# Patient Record
Sex: MALE | Race: WHITE | NOT HISPANIC OR LATINO | Employment: UNEMPLOYED | URBAN - METROPOLITAN AREA
[De-identification: names, ages, dates, MRNs, and addresses within clinical notes are randomized per-mention and may not be internally consistent; named-entity substitution may affect disease eponyms.]

---

## 2019-05-22 ENCOUNTER — HOSPITAL ENCOUNTER (EMERGENCY)
Facility: HOSPITAL | Age: 54
Discharge: LEFT BEFORE DISCHARGE | End: 2019-05-23
Attending: EMERGENCY MEDICINE
Payer: OTHER GOVERNMENT

## 2019-05-22 VITALS
TEMPERATURE: 98 F | RESPIRATION RATE: 16 BRPM | WEIGHT: 155 LBS | DIASTOLIC BLOOD PRESSURE: 83 MMHG | BODY MASS INDEX: 24.91 KG/M2 | OXYGEN SATURATION: 99 % | HEART RATE: 88 BPM | SYSTOLIC BLOOD PRESSURE: 153 MMHG | HEIGHT: 66 IN

## 2019-05-22 DIAGNOSIS — Z53.21 ELOPED FROM EMERGENCY DEPARTMENT: Primary | ICD-10-CM

## 2019-05-22 PROCEDURE — 99900 PR LEFT WITHOUTBEING SEEN-EMERGENCY: ICD-10-PCS | Mod: ,,, | Performed by: EMERGENCY MEDICINE

## 2019-05-22 PROCEDURE — 99900 PR LEFT WITHOUTBEING SEEN-EMERGENCY: CPT | Mod: ,,, | Performed by: EMERGENCY MEDICINE

## 2019-05-22 PROCEDURE — 99281 EMR DPT VST MAYX REQ PHY/QHP: CPT

## 2019-05-23 NOTE — ED PROVIDER NOTES
Encounter Date: 5/22/2019       History     Chief Complaint   Patient presents with    Neck Pain     Pt reports pain to upper neck x few months. Pt states that he placed Lidocaine patch to area and feels as if there is something crawling under his skin now.      Eloped from ED        Review of patient's allergies indicates:  No Known Allergies  History reviewed. No pertinent past medical history.  History reviewed. No pertinent surgical history.  History reviewed. No pertinent family history.  Social History     Tobacco Use    Smoking status: Current Every Day Smoker   Substance Use Topics    Alcohol use: Not Currently     Frequency: Never    Drug use: Never     Review of Systems    Physical Exam     Initial Vitals [05/22/19 2231]   BP Pulse Resp Temp SpO2   (!) 153/83 88 16 98 °F (36.7 °C) 99 %      MAP       --         Physical Exam    ED Course   Procedures  Labs Reviewed - No data to display       Imaging Results    None                               Clinical Impression:       ICD-10-CM ICD-9-CM   1. Eloped from emergency department Z53.21 V64.2                                Bc Carreon MD  05/23/19 0018

## 2019-05-23 NOTE — ED NOTES
Pt states that is unable to wait anymore due to family emergency. States he is unable to wait to speak to MD. Ambulated out with no assistance.

## 2019-05-23 NOTE — ED TRIAGE NOTES
Santo Lockhart, a 54 y.o. male presents to the ED w/ complaint of neck pain/scalp pain. Pt reports pain began approx a month ago. Pt reports using Lidocaine patches causes him to feel as if there is something crawling under his skin. Pt states that he has pictures on his phone but would rather wait to show MD. Small circular skin abrasion noted to right occipital region of scalp.     Triage note:  Chief Complaint   Patient presents with    Neck Pain     Pt reports pain to upper neck x few months. Pt states that he placed Lidocaine patch to area and feels as if there is something crawling under his skin now.      Review of patient's allergies indicates:  No Known Allergies  History reviewed. No pertinent past medical history.     Adult Physical Assessment  LOC: Santo Lockhart, 54 y.o. male verified via two identifiers.  The patient is awake, alert, oriented and speaking appropriately at this time.  APPEARANCE: Patient resting comfortably and appears to be in no acute distress at this time. Patient is clean and well groomed, patient's clothing is properly fastened.  SKIN:The skin is warm and dry, color consistent with ethnicity, patient has normal skin turgor and moist mucus membranes, no brusing noted. Small circular abrasion noted to right occipital area.  MUSCULOSKELETAL: Patient moving all extremities well, no obvious swelling or deformities noted.  RESPIRATORY: Airway is open and patent, respirations are spontaneous, patient has a normal effort and rate, no accessory muscle use noted.  CARDIAC: Patient has a normal rate and rhythm, no periphreal edema noted in any extremity, capillary refill < 3 seconds in all extremities  ABDOMEN: Soft and non tender to palpation, no abdominal distention noted. Bowel sounds present in all four quadrants.  NEUROLOGIC: Eyes open spontaneously, behavior appropriate to situation, follows commands, facial expression symmetrical, bilateral hand grasp equal and even, purposeful  motor response noted, normal sensation in all extremities when touched with a finger.

## 2019-05-30 ENCOUNTER — HOSPITAL ENCOUNTER (EMERGENCY)
Facility: HOSPITAL | Age: 54
Discharge: HOME | End: 2019-05-31
Attending: EMERGENCY MEDICINE
Payer: OTHER GOVERNMENT

## 2019-05-30 VITALS
RESPIRATION RATE: 18 BRPM | DIASTOLIC BLOOD PRESSURE: 81 MMHG | BODY MASS INDEX: 29.73 KG/M2 | SYSTOLIC BLOOD PRESSURE: 130 MMHG | TEMPERATURE: 98 F | WEIGHT: 185 LBS | OXYGEN SATURATION: 98 % | HEART RATE: 81 BPM | HEIGHT: 66 IN

## 2019-05-30 DIAGNOSIS — S05.01XS ABRASION OF RIGHT CORNEA, SEQUELA: Primary | ICD-10-CM

## 2019-05-30 PROCEDURE — 99284 EMERGENCY DEPT VISIT MOD MDM: CPT | Mod: ,,, | Performed by: EMERGENCY MEDICINE

## 2019-05-30 PROCEDURE — 99283 EMERGENCY DEPT VISIT LOW MDM: CPT

## 2019-05-30 PROCEDURE — 99284 PR EMERGENCY DEPT VISIT,LEVEL IV: ICD-10-PCS | Mod: ,,, | Performed by: EMERGENCY MEDICINE

## 2019-05-30 RX ORDER — DABIGATRAN ETEXILATE 150 MG/1
150 CAPSULE ORAL 2 TIMES DAILY
COMMUNITY

## 2019-05-31 PROCEDURE — 25000003 PHARM REV CODE 250: Performed by: EMERGENCY MEDICINE

## 2019-05-31 RX ORDER — TETRACAINE HYDROCHLORIDE 5 MG/ML
2 SOLUTION OPHTHALMIC
Status: COMPLETED | OUTPATIENT
Start: 2019-05-31 | End: 2019-05-31

## 2019-05-31 RX ADMIN — FLUORESCEIN SODIUM 1 EACH: 1 STRIP OPHTHALMIC at 12:05

## 2019-05-31 RX ADMIN — TETRACAINE HYDROCHLORIDE 2 DROP: 5 SOLUTION OPHTHALMIC at 12:05

## 2019-05-31 NOTE — ED TRIAGE NOTES
"Patient states for the past week his eyes have been feeling scratchy, burning, painful accompanied with headaches and blurry vision  He states " I have spots on my head I think it has to do with my eyes, I need the doctor to look at it.:  He also endorses decreased appetite and the ability to sleep, nausea, and heartburn. Patient states he left eye hurts more than the right.  He denies floaters or double vision or being around any environmental hazards that could have caused this. AAO x4. Ambulates independently.  "

## 2019-05-31 NOTE — ED PROVIDER NOTES
Encounter Date: 5/30/2019       History     Chief Complaint   Patient presents with    Headache     Pt reports headache since yesterday, intermittent x few weeks.    Eye Pain     x few days, states both eyes feel irritated as if he has something in them     54-year-old male presents with complaints of bilateral eye irritation and pruritus that began over a week.  He denies exposure to irritants secondary to his occupation,dizziness, LOC, recent eye injury, serous drainage, purulent drainage or loss of vision.  The patient states that he went on global to searches symptoms and believes that he may have parasites in his skull that are ongoing into both of his eyes.        Review of patient's allergies indicates:  No Known Allergies  Past Medical History:   Diagnosis Date    Pulmonary embolism 2013/2014     History reviewed. No pertinent surgical history.  History reviewed. No pertinent family history.  Social History     Tobacco Use    Smoking status: Current Every Day Smoker   Substance Use Topics    Alcohol use: Not Currently     Frequency: Never    Drug use: Never     Review of Systems   Constitutional: Negative for activity change and appetite change.   HENT: Negative for facial swelling.    Eyes: Positive for itching. Negative for pain.   Respiratory: Negative for shortness of breath.    Cardiovascular: Negative for chest pain.   Gastrointestinal: Negative for abdominal pain.   Genitourinary: Negative for dysuria.   Musculoskeletal: Negative for arthralgias and back pain.   Skin: Negative for color change.   Neurological: Negative for dizziness and numbness.   Psychiatric/Behavioral: Negative for confusion.   All other systems reviewed and are negative.      Physical Exam     Initial Vitals [05/30/19 2319]   BP Pulse Resp Temp SpO2   130/81 81 18 98.1 °F (36.7 °C) 98 %      MAP       --         Physical Exam    Nursing note and vitals reviewed.  Constitutional: Vital signs are normal. He appears  well-developed and well-nourished.   HENT:   Head: Normocephalic and atraumatic.   Eyes: Conjunctivae, EOM and lids are normal. Pupils are equal, round, and reactive to light.   Neck: Normal range of motion. Neck supple.   Cardiovascular: Normal rate, regular rhythm, S1 normal, S2 normal, normal heart sounds, intact distal pulses and normal pulses.   Pulmonary/Chest: Breath sounds normal.   Abdominal: Soft. Normal appearance and bowel sounds are normal.   Musculoskeletal: Normal range of motion.   Neurological: He is alert and oriented to person, place, and time. No cranial nerve deficit or sensory deficit.   Skin: Skin is warm and dry. Capillary refill takes less than 2 seconds.   Psychiatric: He has a normal mood and affect. His speech is normal and behavior is normal. Judgment normal.           Santo Lockhart presents with clinical signs and symptoms that correlate but is not limited to corneal abrasion, conjunctivitis, blepharitis,Holoredeum     I performed a detailed H&P,ordered ophthalmic tetracaine and ophthalmic strips.    Radha Yates M.D  Emergency Medicine PGY-4  12:05 AM     UPDATE      On exam the patient had a minor right corneal abrasion which is currently receiving treatment for with eyedrops.  No other abnormalities visualized on bedside was that exam.  Visual acuity results with 20/30 on the right and 20/50 on the left.  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  He was instructed to wear protective eyewear while working, complete the entire course of oral/opthalmic antibiotics and to follow up with his primary care physician.  Vitals are stable he is A&O x3.  Radha Yates MD    ED Course   Procedures  Labs Reviewed - No data to display       Imaging Results    None                       Attending Attestation:   Physician Attestation Statement for Resident:  As the supervising MD   Physician Attestation  Statement: I have personally seen and examined this patient.   I agree with the above history. -:   As the supervising MD I agree with the above PE.    As the supervising MD I agree with the above treatment, course, plan, and disposition.            I have reviewed and concur with the resident's history, physical, assessment, and plan.  I have personally interviewed and examined the patient at bedside.  See below addendum for my evaluation and additional findings.    Briefly,  this is a 54 y.o.male complaining of bilateral eye retention and pruritus that began over a week ago.  Denies any exposure.  He is afebrile vital signs are stable. He had recently been seen by outside hospital and Clinic and found to have corneal abrasion with eyedrops prescribed.  Physical exam findings unremarkable, with no focal findings, optic injury, with small abrasion noted. He currently has oral an ophthalmic antibiotics will follow up with his PCP or Ophthalmology as needed. Patient agreeable to discharge plan. Strict ED precautions and return instructions discussed at length and patient verbalized understanding. All questions were answered and ample time was given for questions.      Complexity:  Moderate      Otoniel Gallagher DO    Dept of Emergency Medicine   [facility]  Spectralink: [id number]               Clinical Impression:       ICD-10-CM ICD-9-CM   1. Abrasion of right cornea, sequela S05.01XS 906.2         Disposition:   Disposition: Discharged  Condition: Stable                        Radha Yates MD  Resident  05/31/19 0030       Otoniel Gallagher DO  06/02/19 2201

## 2019-06-13 DIAGNOSIS — R20.2 NUMBNESS AND TINGLING IN BOTH HANDS: Primary | ICD-10-CM

## 2019-06-13 DIAGNOSIS — R20.0 NUMBNESS AND TINGLING IN BOTH HANDS: Primary | ICD-10-CM

## 2019-06-13 DIAGNOSIS — M79.642 PAIN IN LEFT HAND: ICD-10-CM

## 2019-06-13 DIAGNOSIS — G56.00 CARPAL TUNNEL SYNDROME: ICD-10-CM

## 2019-09-18 ENCOUNTER — HOSPITAL ENCOUNTER (EMERGENCY)
Facility: HOSPITAL | Age: 54
Discharge: HOME | End: 2019-09-18
Attending: EMERGENCY MEDICINE
Payer: OTHER GOVERNMENT

## 2019-09-18 VITALS
RESPIRATION RATE: 18 BRPM | TEMPERATURE: 99 F | OXYGEN SATURATION: 97 % | SYSTOLIC BLOOD PRESSURE: 141 MMHG | WEIGHT: 155 LBS | HEIGHT: 66 IN | BODY MASS INDEX: 24.91 KG/M2 | HEART RATE: 87 BPM | DIASTOLIC BLOOD PRESSURE: 76 MMHG

## 2019-09-18 DIAGNOSIS — R10.13 EPIGASTRIC PAIN: ICD-10-CM

## 2019-09-18 DIAGNOSIS — R20.2 FORMICATION: ICD-10-CM

## 2019-09-18 DIAGNOSIS — H92.02 POSTERIOR AURICULAR PAIN OF LEFT EAR: Primary | ICD-10-CM

## 2019-09-18 PROCEDURE — 93010 ELECTROCARDIOGRAM REPORT: CPT | Mod: ,,, | Performed by: INTERNAL MEDICINE

## 2019-09-18 PROCEDURE — 99283 EMERGENCY DEPT VISIT LOW MDM: CPT | Mod: 25

## 2019-09-18 PROCEDURE — 93005 ELECTROCARDIOGRAM TRACING: CPT

## 2019-09-18 PROCEDURE — 99285 PR EMERGENCY DEPT VISIT,LEVEL V: ICD-10-PCS | Mod: ,,, | Performed by: PHYSICIAN ASSISTANT

## 2019-09-18 PROCEDURE — 99285 EMERGENCY DEPT VISIT HI MDM: CPT | Mod: ,,, | Performed by: PHYSICIAN ASSISTANT

## 2019-09-18 PROCEDURE — 93010 EKG 12-LEAD: ICD-10-PCS | Mod: ,,, | Performed by: INTERNAL MEDICINE

## 2019-09-18 RX ORDER — MIRTAZAPINE 30 MG/1
30 TABLET, FILM COATED ORAL NIGHTLY
COMMUNITY

## 2019-09-18 RX ORDER — BUPRENORPHINE HYDROCHLORIDE AND NALOXONE HYDROCHLORIDE DIHYDRATE 8; 2 MG/1; MG/1
TABLET SUBLINGUAL
COMMUNITY

## 2019-09-18 RX ORDER — CEPHALEXIN 250 MG/1
500 CAPSULE ORAL 4 TIMES DAILY
Qty: 56 CAPSULE | Refills: 0 | Status: SHIPPED | OUTPATIENT
Start: 2019-09-18 | End: 2019-09-25

## 2019-09-18 RX ORDER — OMEPRAZOLE 20 MG/1
20 CAPSULE, DELAYED RELEASE ORAL DAILY
COMMUNITY

## 2019-09-19 NOTE — DISCHARGE INSTRUCTIONS
You have evidence of a skin infection behind your left ear. You should take Keflex as treatment. Please follow up with your primary care physician within a week. You should also follow up with your Psychiatrist on Monday. Return to the ER if you develop any other concerning symptoms.

## 2019-09-19 NOTE — ED TRIAGE NOTES
"Pt reports epigastric 6/10 sharp intermittent pain that started at rest x2 days. Pt reports "stinging" posterior head with "hooks" on skin posterior head. Patient also has red sore x1 day behind L ear. Sore is constant "stinging" and tender. Denies drainage. Pt came in with 1 page of hand written symptoms that he handed to RN. These include: "blood thinners, headache, sores stinging, "webbing in ears, mouth throat", nose and eyes x1 year, dysphagia x1 yea, trouble sleeping, fatigue, dizziness, chest pain x3 days, scalp sores, depression, severe anxiety, stinging in R eye x1 year, light sensitivity 2-3 months." [facility]  "

## 2019-09-19 NOTE — ED PROVIDER NOTES
"Encounter Date: 9/18/2019       History     Chief Complaint   Patient presents with    Multiple Complaints     epigastric pain x2 days, fatigue, palpitations last night, sore to L side of his scalp behind his ears, no drainage noted--area red     54-year-old male with PMHx of PE who presents to the ED with c/o left posterior ear pain and headache. Per pt, he developed left posterior ear pain 2 months ago, which has been gradually worsening since. He reports associated posterior headache. He describes his pain as an intermittent stinging, rated 4/10. Per pt, if he pulls his skin, he feels "webbing" behind his ear, described as "wings woven together." He originally believed his symptoms were related to a fungal infection, but then he developed the "webbing." He has taken tylenol and gabapentin, which has provided some relief. He also has felt like, "something is crawling on his head" for the past 3 years and has had a multitude of symptoms since this developed, including, "net feeling my tongue, trouble swallowing, stinging eyes." He has chronic anxiety, depression, and formication and is followed by a Psychiatrist, who prescribed him risperidone for his symptoms. He has an appointment with his Psychiatrist in 5 days. Pt also reports chronic intermittent epigastric pain. Denies fevers, chills, SOB, n/v/d, urinary symptoms, dysuria, hematuria, SI, HI, or any other medical complaints.     The history is provided by the patient.     Review of patient's allergies indicates:  No Known Allergies  Past Medical History:   Diagnosis Date    Pulmonary embolism 2013/2014     History reviewed. No pertinent surgical history.  History reviewed. No pertinent family history.  Social History     Tobacco Use    Smoking status: Current Every Day Smoker     Packs/day: 1.00     Types: Cigarettes   Substance Use Topics    Alcohol use: Not Currently     Frequency: Never    Drug use: Not Currently     Review of Systems   Constitutional: " Negative for chills and fever.   HENT: Positive for ear pain and trouble swallowing (chronic). Negative for congestion, ear discharge, nosebleeds, rhinorrhea, sinus pressure, sinus pain, sore throat and tinnitus.         +posterior left ear pain/lesion   Eyes: Positive for pain (chronic). Negative for redness and visual disturbance.   Respiratory: Negative for shortness of breath.    Cardiovascular: Negative for chest pain.   Gastrointestinal: Positive for abdominal pain. Negative for constipation, diarrhea, nausea and vomiting.        +chronic intermittent epigastric pain   Genitourinary: Negative for dysuria, frequency and hematuria.   Musculoskeletal: Negative for back pain and neck pain.   Skin: Positive for wound. Negative for pallor.   Neurological: Positive for headaches. Negative for dizziness, syncope and numbness.   Psychiatric/Behavioral: Negative for confusion and suicidal ideas.       Physical Exam     Initial Vitals [09/18/19 2219]   BP Pulse Resp Temp SpO2   (!) 141/76 87 18 98.5 °F (36.9 °C) 97 %      MAP       --         Physical Exam    Nursing note and vitals reviewed.  Constitutional: He appears well-developed and well-nourished.   HENT:   Head: Normocephalic and atraumatic.   Right Ear: Tympanic membrane and ear canal normal.   Left Ear: Tympanic membrane and ear canal normal.   Nose: Nose normal. No rhinorrhea. Right sinus exhibits no maxillary sinus tenderness and no frontal sinus tenderness. Left sinus exhibits no maxillary sinus tenderness and no frontal sinus tenderness.   Mouth/Throat: Uvula is midline, oropharynx is clear and moist and mucous membranes are normal.   Scabs to posterior scalp at various stages of healing. Larger 2 cm scab with surrounding erythema to left posterior auricle with no tenderness to palpation. Of note, pt was touching and rubbing the area throughout the exam. Pt is tolerating oral secretions.    Eyes: Conjunctivae and EOM are normal. Pupils are equal, round, and  reactive to light.   Neck: Normal range of motion. Neck supple.   Cardiovascular: Normal rate, regular rhythm and normal heart sounds.   Pulmonary/Chest: Breath sounds normal. He has no wheezes. He has no rhonchi. He has no rales.   Abdominal: Soft. There is no tenderness. There is no rebound and no guarding.   Musculoskeletal: Normal range of motion. He exhibits no edema or tenderness.   No midline C, T, or L spinal tenderness to palpation.   Lymphadenopathy:     He has no cervical adenopathy.   Neurological: He is alert and oriented to person, place, and time.   Skin: Skin is warm.   Psychiatric: He has a normal mood and affect.                 ED Course   Procedures  Labs Reviewed - No data to display     ECG Results          EKG 12-lead (In process)  Result time 09/19/19 08:24:42    In process by Interface, Lab In [facility] (09/19/19 08:24:42)                 Narrative:    Test Reason : R07.9,    Vent. Rate : 083 BPM     Atrial Rate : 083 BPM     P-R Int : 158 ms          QRS Dur : 090 ms      QT Int : 352 ms       P-R-T Axes : 062 081 047 degrees     QTc Int : 413 ms    Normal sinus rhythm  Normal ECG  No previous ECGs available    Referred By: [email or web address]   SELF           Confirmed By:                             Imaging Results    None          Medical Decision Making:   History:   Old Medical Records: I decided to obtain old medical records.  Old Records Summarized: records from clinic visits.       <> Summary of Records: Pt was evaluated in ED on 6/21/2019 for eye pain.   Initial Assessment:   54-year-old male with PMHx of PE who presents to the ED with c/o left posterior ear pain and headache. Per pt, he developed left posterior ear pain 2 months ago, which has been gradually worsening since. He reports associated posterior headache. He has chronic formication and is followed by Psych closely and takes risperidone. VSS. Afebrile. RRR. Lungs CTA bilaterally. Abdomen soft and TTP. Neurovascularly intact  throughout. Scabs to posterior scalp at various stages of healing. Larger 2 cm scab with surrounding erythema to left posterior auricle with no tenderness to palpation. Of note, pt was touching and rubbing the area throughout the exam. Pt is tolerating oral secretions.   Differential Diagnosis:   DDx includes but is not limited to formication, cellulitis, open wound, trichotillomania.   Independently Interpreted Test(s):   I have ordered and independently interpreted EKG Reading(s) - see summary below  Clinical Tests:   Medical Tests: Ordered and Reviewed  ED Management:  EKG with NSR at rate of 83, no STEMI. Pt is pulling and rubbing his scab and hair behind right ear throughout his exam. Long discussion regarding patient's presentation and ways to prevent further skin infections. Pt is non septic appearing and is no acute distress. Pt most likely has a superficial superimposed infection with picking his skin. Will prescribe Keflex. Pt has follow up with Psych on Monday, stressed the importance of attending this appointment. He also should follow up with his PCP within a week. Strict ER return precautions given. All questions answered. Pt expressed understanding and is agreeable with the plan.     I have discussed the treatment and management of this patient with my supervising physician, and we agree on the plan of care.      Maday Nolan PA-C                        Clinical Impression:       ICD-10-CM ICD-9-CM   1. Posterior auricular pain of left ear H92.02 388.70   2. Formication R20.2 782.0   3. Epigastric pain R10.13 789.06         Disposition:   Disposition: Discharged  Condition: Stable                             Maday Nolan PA-C  09/19/19 1028

## 2019-09-26 ENCOUNTER — TELEPHONE (OUTPATIENT)
Dept: NEUROLOGY | Facility: CLINIC | Age: 54
End: 2019-09-26

## 2019-09-26 NOTE — TELEPHONE ENCOUNTER
----- Message from Mary Miller sent at 9/26/2019  1:55 PM CDT -----  Contact: Ms. Lagunas ([facility]): [phone] ext [phone]  Ms. Lagunas called to speak with someone re getting the pt scheduled to see Ms. Janneth Murray ([facility]): [phone] ext [phone]

## 2020-01-14 ENCOUNTER — DOCUMENTATION ONLY (OUTPATIENT)
Dept: NEUROLOGY | Facility: CLINIC | Age: 55
End: 2020-01-14

## 2020-01-14 NOTE — PROGRESS NOTES
Patient was scheduled and confirmed for EMG procedure but was a no-show.    Keyon Farmer MD  [facility]

## 2021-06-03 ENCOUNTER — NURSE TRIAGE (OUTPATIENT)
Dept: ADMINISTRATIVE | Facility: CLINIC | Age: 56
End: 2021-06-03

## 2021-06-03 ENCOUNTER — HOSPITAL ENCOUNTER (EMERGENCY)
Facility: HOSPITAL | Age: 56
Discharge: HOME | End: 2021-06-03
Attending: EMERGENCY MEDICINE
Payer: OTHER GOVERNMENT

## 2021-06-03 VITALS
SYSTOLIC BLOOD PRESSURE: 152 MMHG | OXYGEN SATURATION: 98 % | TEMPERATURE: 98 F | WEIGHT: 170 LBS | RESPIRATION RATE: 18 BRPM | DIASTOLIC BLOOD PRESSURE: 78 MMHG | HEART RATE: 66 BPM | HEIGHT: 66 IN | BODY MASS INDEX: 27.32 KG/M2

## 2021-06-03 DIAGNOSIS — F11.10 HEROIN ABUSE: Primary | ICD-10-CM

## 2021-06-03 PROCEDURE — 99281 EMR DPT VST MAYX REQ PHY/QHP: CPT

## 2021-06-03 PROCEDURE — 99282 PR EMERGENCY DEPT VISIT,LEVEL II: ICD-10-PCS | Mod: ,,, | Performed by: EMERGENCY MEDICINE

## 2021-06-03 PROCEDURE — 99282 EMERGENCY DEPT VISIT SF MDM: CPT | Mod: ,,, | Performed by: EMERGENCY MEDICINE

## 2022-01-08 ENCOUNTER — HOSPITAL ENCOUNTER (EMERGENCY)
Facility: HOSPITAL | Age: 57
Discharge: HOME | End: 2022-01-09
Attending: EMERGENCY MEDICINE
Payer: OTHER GOVERNMENT

## 2022-01-08 VITALS
TEMPERATURE: 98 F | HEART RATE: 73 BPM | RESPIRATION RATE: 18 BRPM | SYSTOLIC BLOOD PRESSURE: 173 MMHG | BODY MASS INDEX: 25.82 KG/M2 | WEIGHT: 160 LBS | DIASTOLIC BLOOD PRESSURE: 82 MMHG

## 2022-01-08 DIAGNOSIS — Z51.89 VISIT FOR WOUND CHECK: Primary | ICD-10-CM

## 2022-01-08 PROCEDURE — 99284 PR EMERGENCY DEPT VISIT,LEVEL IV: ICD-10-PCS | Mod: ,,, | Performed by: EMERGENCY MEDICINE

## 2022-01-08 PROCEDURE — 99281 EMR DPT VST MAYX REQ PHY/QHP: CPT

## 2022-01-08 PROCEDURE — 99284 EMERGENCY DEPT VISIT MOD MDM: CPT | Mod: ,,, | Performed by: EMERGENCY MEDICINE

## 2022-01-09 NOTE — ED PROVIDER NOTES
Encounter Date: 1/8/2022    SCRIBE #1 NOTE: I, Kong Damon, am scribing for, and in the presence of,  Kylah Purcell MD. I have scribed the following portions of the note - Other sections scribed: HPI, ROS.       History     Chief Complaint   Patient presents with    Wound Check     Pt wants to get a few wounds on his skin evaluated. He is also c/o a headache with photophobia. [facility].      Time patient was seen by the provider: 12:00 AM    The patient is a 56 y.o. male with co-morbidities including: pulmonary embolism and a former daily user of heroin (nasal) who presents to the ED with a complaint of wanting some wounds checked. He states he has some wounds on his skin he wants to have looked at. He states they are on his hands and there is an ulcer o his face. He estimates the lesions have been there for months. He states he also has a headache and light sensitivity. He is unsure when the photophobia began, but he estimates it has been going on for months. He states when he closes his eyes, it feels like something is flying around in his eyes. He states this problem is more pronounced in his right eye. He states he has been unable to seep for 3-4 days. He states he has had a sore glutes. He states he has intermittent coughing. He states his heart occasionally skips a beat. He believes the symptoms are all connected. He states he stopped using heroin eight months ago. He states his main concern for coming here tonight are the lesions on his skin. He states [facility]. He states they gave him some antibiotics for his eyes and told him to see a dermatologist for his skin concerns.     The history is provided by the patient and medical records. No  was used.     Review of patient's allergies indicates:  No Known Allergies  Past Medical History:   Diagnosis Date    Pulmonary embolism 2013/2014     No past surgical history on file.  No  family history on file.  Social History     Tobacco Use    Smoking status: Current Every Day Smoker     Packs/day: 1.00     Types: Cigarettes    Smokeless tobacco: Never Used   Substance Use Topics    Alcohol use: Not Currently    Drug use: Yes     Comment: Daily use nasal Heroin daily use, last use midnight     Review of Systems   Eyes: Positive for photophobia and visual disturbance (something is' flying around in his eyes' when cloed).   Respiratory: Positive for cough (intermittent).    Cardiovascular: Positive for palpitations (occasionally ).   Gastrointestinal: Positive for rectal pain (sore).   Skin: Positive for wound (lesions present on hands, ulcer present on face).   Neurological: Positive for headaches.   All other systems reviewed and are negative.      Physical Exam     Initial Vitals [01/08/22 2348]   BP Pulse Resp Temp SpO2   (!) 173/82 73 18 97.8 °F (36.6 °C) --      MAP       --         Physical Exam    Nursing note and vitals reviewed.  Constitutional: He appears well-developed and well-nourished. He is not diaphoretic. No distress.   HENT:   Head: Normocephalic and atraumatic.   Mouth/Throat: Oropharynx is clear and moist.   Eyes: Conjunctivae and EOM are normal. Pupils are equal, round, and reactive to light.   No photophobia   Neck: Neck supple.   No nuchal rigidity   Normal range of motion.  Cardiovascular: Normal rate.   Pulmonary/Chest: No respiratory distress.   Musculoskeletal:      Cervical back: Normal range of motion and neck supple.     Neurological: He is alert and oriented to person, place, and time. He has normal strength. No cranial nerve deficit or sensory deficit. GCS score is 15. GCS eye subscore is 4. GCS verbal subscore is 5. GCS motor subscore is 6.   Skin: Skin is warm and dry.   2 cm open wound to R lower face prox to angle of the mandible.  No surrounding erythema, no drainage    Small 1 cm open wound to left medial ankle    0.1 cm wound to left wrist   Psychiatric:  He has a normal mood and affect.         ED Course   Procedures  Labs Reviewed   HIV 1 / 2 ANTIBODY   HEPATITIS C ANTIBODY          Imaging Results    None          Medications - No data to display  Medical Decision Making:   History:   Old Medical Records: I decided to obtain old medical records.  Initial Assessment:   57 y/o m, here with multiple complaints    1. Several open wounds in various stages of healing, no active signs of infection  -suspect excoriations, possible delusional parasitosis. much lower suspicion for infection  -[facility], already has referral placed to dermatology    2. Mild HA with report of light sensitivity x months.  On exam, pt with normal VS, no photophobia, normal neuro exam.  Pt's MCKNIGHT seems benign in nature.  Most likely migraine or tension HA.  Have considered SAH, meningitis/encephalitis, central venous thrombosis, temporal arteritis, pituitary apoplexy, vertebral/carotid artery dissection and and other serious causes of HA but pt's clinical presentation does not seem consistent with these diagnoses.  Feel that working pt up for these diagnoses would introduce serious burdens and risks and that the best plan now is one of supportive care, clear discharge instructions and close f/u with PCP.    Clinical Tests:   Lab Tests: Ordered and Reviewed          Scribe Attestation:   Scribe #1: I performed the above scribed service and the documentation accurately describes the services I performed. I attest to the accuracy of the note.                 Clinical Impression:   Final diagnoses:  [Z51.89] Visit for wound check (Primary)          ED Disposition Condition    Discharge Stable        ED Prescriptions     None        Follow-up Information     Follow up With Specialties Details Why Contact [facility]  Schedule an appointment as soon as possible for a visit   [address] [facility] [address]  [phone]             Kylah Purcell,  MD  01/09/22 0035

## 2022-01-09 NOTE — ED TRIAGE NOTES
Pt c/o several wounds that he wants to be checked out. Is concerned they are infected despite being [facility]. Also c/o headaches w/ photophobia and pt is unable to sleep as well. States he also has floaters in his eyes.